# Patient Record
Sex: MALE | Race: WHITE | NOT HISPANIC OR LATINO
[De-identification: names, ages, dates, MRNs, and addresses within clinical notes are randomized per-mention and may not be internally consistent; named-entity substitution may affect disease eponyms.]

---

## 2017-01-05 ENCOUNTER — RX RENEWAL (OUTPATIENT)
Age: 62
End: 2017-01-05

## 2017-01-09 ENCOUNTER — OUTPATIENT (OUTPATIENT)
Dept: OUTPATIENT SERVICES | Facility: HOSPITAL | Age: 62
LOS: 1 days | End: 2017-01-09
Payer: COMMERCIAL

## 2017-01-09 VITALS
TEMPERATURE: 99 F | WEIGHT: 198.42 LBS | RESPIRATION RATE: 16 BRPM | HEIGHT: 72 IN | SYSTOLIC BLOOD PRESSURE: 101 MMHG | HEART RATE: 68 BPM | DIASTOLIC BLOOD PRESSURE: 60 MMHG

## 2017-01-09 DIAGNOSIS — A63.0 ANOGENITAL (VENEREAL) WARTS: ICD-10-CM

## 2017-01-09 DIAGNOSIS — Z01.818 ENCOUNTER FOR OTHER PREPROCEDURAL EXAMINATION: ICD-10-CM

## 2017-01-09 DIAGNOSIS — I10 ESSENTIAL (PRIMARY) HYPERTENSION: ICD-10-CM

## 2017-01-09 DIAGNOSIS — Z98.61 CORONARY ANGIOPLASTY STATUS: Chronic | ICD-10-CM

## 2017-01-09 DIAGNOSIS — Z21 ASYMPTOMATIC HUMAN IMMUNODEFICIENCY VIRUS [HIV] INFECTION STATUS: ICD-10-CM

## 2017-01-09 DIAGNOSIS — Z12.11 ENCOUNTER FOR SCREENING FOR MALIGNANT NEOPLASM OF COLON: ICD-10-CM

## 2017-01-09 DIAGNOSIS — I25.10 ATHEROSCLEROTIC HEART DISEASE OF NATIVE CORONARY ARTERY WITHOUT ANGINA PECTORIS: ICD-10-CM

## 2017-01-09 LAB
ANION GAP SERPL CALC-SCNC: 13 MMOL/L — SIGNIFICANT CHANGE UP (ref 5–17)
APTT BLD: 33.1 SEC — SIGNIFICANT CHANGE UP (ref 27.5–37.4)
BUN SERPL-MCNC: 26 MG/DL — HIGH (ref 8–20)
CALCIUM SERPL-MCNC: 9.9 MG/DL — SIGNIFICANT CHANGE UP (ref 8.6–10.2)
CHLORIDE SERPL-SCNC: 102 MMOL/L — SIGNIFICANT CHANGE UP (ref 98–107)
CO2 SERPL-SCNC: 26 MMOL/L — SIGNIFICANT CHANGE UP (ref 22–29)
CREAT SERPL-MCNC: 1.24 MG/DL — SIGNIFICANT CHANGE UP (ref 0.5–1.3)
GLUCOSE SERPL-MCNC: 87 MG/DL — SIGNIFICANT CHANGE UP (ref 70–115)
HCT VFR BLD CALC: 44.7 % — SIGNIFICANT CHANGE UP (ref 42–52)
HGB BLD-MCNC: 14.7 G/DL — SIGNIFICANT CHANGE UP (ref 14–18)
INR BLD: 1.15 RATIO — SIGNIFICANT CHANGE UP (ref 0.88–1.16)
MCHC RBC-ENTMCNC: 32.9 G/DL — SIGNIFICANT CHANGE UP (ref 32–36)
MCHC RBC-ENTMCNC: 33.5 PG — HIGH (ref 27–31)
MCV RBC AUTO: 101.8 FL — HIGH (ref 80–94)
PLATELET # BLD AUTO: 201 K/UL — SIGNIFICANT CHANGE UP (ref 150–400)
POTASSIUM SERPL-MCNC: 4.6 MMOL/L — SIGNIFICANT CHANGE UP (ref 3.5–5.3)
POTASSIUM SERPL-SCNC: 4.6 MMOL/L — SIGNIFICANT CHANGE UP (ref 3.5–5.3)
PROTHROM AB SERPL-ACNC: 12.7 SEC — SIGNIFICANT CHANGE UP (ref 10–13.1)
RBC # BLD: 4.39 M/UL — LOW (ref 4.6–6.2)
RBC # FLD: 13.2 % — SIGNIFICANT CHANGE UP (ref 11–15.6)
SODIUM SERPL-SCNC: 141 MMOL/L — SIGNIFICANT CHANGE UP (ref 135–145)
WBC # BLD: 9.53 K/UL — SIGNIFICANT CHANGE UP (ref 4.8–10.8)
WBC # FLD AUTO: 9.53 K/UL — SIGNIFICANT CHANGE UP (ref 4.8–10.8)

## 2017-01-09 PROCEDURE — 85027 COMPLETE CBC AUTOMATED: CPT

## 2017-01-09 PROCEDURE — 93005 ELECTROCARDIOGRAM TRACING: CPT

## 2017-01-09 PROCEDURE — 80048 BASIC METABOLIC PNL TOTAL CA: CPT

## 2017-01-09 PROCEDURE — 85610 PROTHROMBIN TIME: CPT

## 2017-01-09 PROCEDURE — 85730 THROMBOPLASTIN TIME PARTIAL: CPT

## 2017-01-09 PROCEDURE — G0463: CPT

## 2017-01-09 PROCEDURE — 93010 ELECTROCARDIOGRAM REPORT: CPT

## 2017-01-09 RX ORDER — TADALAFIL 10 MG/1
1 TABLET, FILM COATED ORAL
Qty: 0 | Refills: 0 | COMMUNITY

## 2017-01-09 RX ORDER — CLONAZEPAM 1 MG
1.5 TABLET ORAL
Qty: 0 | Refills: 0 | COMMUNITY

## 2017-01-09 RX ORDER — OMEGA-3 ACID ETHYL ESTERS 1 G
1360 CAPSULE ORAL
Qty: 0 | Refills: 0 | COMMUNITY

## 2017-01-09 RX ORDER — RITONAVIR 100 MG/1
1 TABLET, FILM COATED ORAL
Qty: 0 | Refills: 0 | COMMUNITY

## 2017-01-09 RX ORDER — ASPIRIN/CALCIUM CARB/MAGNESIUM 324 MG
1 TABLET ORAL
Qty: 0 | Refills: 0 | COMMUNITY

## 2017-01-09 RX ORDER — LISINOPRIL 2.5 MG/1
1 TABLET ORAL
Qty: 0 | Refills: 0 | COMMUNITY

## 2017-01-09 RX ORDER — CHOLECALCIFEROL (VITAMIN D3) 125 MCG
1 CAPSULE ORAL
Qty: 0 | Refills: 0 | COMMUNITY

## 2017-01-09 RX ORDER — EMTRICITABINE AND TENOFOVIR DISOPROXIL FUMARATE 200; 300 MG/1; MG/1
1 TABLET, FILM COATED ORAL
Qty: 0 | Refills: 0 | COMMUNITY

## 2017-01-09 RX ORDER — CARVEDILOL PHOSPHATE 80 MG/1
1 CAPSULE, EXTENDED RELEASE ORAL
Qty: 0 | Refills: 0 | COMMUNITY

## 2017-01-09 RX ORDER — ATAZANAVIR 200 MG/1
1 CAPSULE ORAL
Qty: 0 | Refills: 0 | COMMUNITY

## 2017-01-09 NOTE — H&P PST ADULT - NSANTHOSAYNRD_GEN_A_CORE
No. MENDY screening performed.  STOP BANG Legend: 0-2 = LOW Risk; 3-4 = INTERMEDIATE Risk; 5-8 = HIGH Risk

## 2017-01-09 NOTE — H&P PST ADULT - FAMILY HISTORY
Mother  Still living? No  Family history of diabetes mellitus, Age at diagnosis: Age Unknown  Family history of pancreatic cancer, Age at diagnosis: Age Unknown     Sibling  Still living? No  Family history of diabetes mellitus, Age at diagnosis: Age Unknown  Family history of breast cancer, Age at diagnosis: Age Unknown  Family history of ALL (acute lymphoid leukemia), Age at diagnosis: Age Unknown     Father  Still living? No  Family history of lung cancer, Age at diagnosis: Age Unknown

## 2017-01-09 NOTE — H&P PST ADULT - HISTORY OF PRESENT ILLNESS
61 year old male who presented to [facility] for a screening colonoscopy, no complains of pain, bleeding or constipation. Last colonoscopy was done 12 years ago

## 2017-01-09 NOTE — H&P PST ADULT - PMH
CAD (coronary artery disease)  with one stent 2010  High cholesterol    HIV (human immunodeficiency virus infection)    Hypertension

## 2017-01-10 DIAGNOSIS — Z01.818 ENCOUNTER FOR OTHER PREPROCEDURAL EXAMINATION: ICD-10-CM

## 2017-01-10 DIAGNOSIS — Z12.11 ENCOUNTER FOR SCREENING FOR MALIGNANT NEOPLASM OF COLON: ICD-10-CM

## 2017-01-13 ENCOUNTER — APPOINTMENT (OUTPATIENT)
Dept: COLORECTAL SURGERY | Facility: HOSPITAL | Age: 62
End: 2017-01-13

## 2017-01-13 ENCOUNTER — OUTPATIENT (OUTPATIENT)
Dept: OUTPATIENT SERVICES | Facility: HOSPITAL | Age: 62
LOS: 1 days | End: 2017-01-13
Payer: COMMERCIAL

## 2017-01-13 DIAGNOSIS — Z98.61 CORONARY ANGIOPLASTY STATUS: Chronic | ICD-10-CM

## 2017-01-13 DIAGNOSIS — Z12.11 ENCOUNTER FOR SCREENING FOR MALIGNANT NEOPLASM OF COLON: ICD-10-CM

## 2017-01-13 DIAGNOSIS — A63.0 ANOGENITAL (VENEREAL) WARTS: ICD-10-CM

## 2017-01-13 PROCEDURE — 88305 TISSUE EXAM BY PATHOLOGIST: CPT | Mod: 26

## 2017-01-13 PROCEDURE — 88305 TISSUE EXAM BY PATHOLOGIST: CPT

## 2017-01-13 PROCEDURE — 45380 COLONOSCOPY AND BIOPSY: CPT | Mod: PT

## 2017-01-13 PROCEDURE — 45378 DIAGNOSTIC COLONOSCOPY: CPT

## 2017-01-17 LAB — SURGICAL PATHOLOGY FINAL REPORT - CH: SIGNIFICANT CHANGE UP

## 2017-01-18 ENCOUNTER — OTHER (OUTPATIENT)
Age: 62
End: 2017-01-18

## 2017-02-07 ENCOUNTER — EMERGENCY (EMERGENCY)
Facility: HOSPITAL | Age: 62
LOS: 1 days | End: 2017-02-07

## 2017-02-07 DIAGNOSIS — Z98.61 CORONARY ANGIOPLASTY STATUS: Chronic | ICD-10-CM

## 2017-03-09 PROBLEM — I10 ESSENTIAL (PRIMARY) HYPERTENSION: Chronic | Status: ACTIVE | Noted: 2017-01-09

## 2017-03-09 PROBLEM — E78.00 PURE HYPERCHOLESTEROLEMIA, UNSPECIFIED: Chronic | Status: ACTIVE | Noted: 2017-01-09

## 2017-03-09 PROBLEM — Z21 ASYMPTOMATIC HUMAN IMMUNODEFICIENCY VIRUS [HIV] INFECTION STATUS: Chronic | Status: ACTIVE | Noted: 2017-01-09

## 2017-03-09 PROBLEM — I25.10 ATHEROSCLEROTIC HEART DISEASE OF NATIVE CORONARY ARTERY WITHOUT ANGINA PECTORIS: Chronic | Status: ACTIVE | Noted: 2017-01-09

## 2017-05-11 ENCOUNTER — RX RENEWAL (OUTPATIENT)
Age: 62
End: 2017-05-11

## 2017-05-11 ENCOUNTER — MEDICATION RENEWAL (OUTPATIENT)
Age: 62
End: 2017-05-11

## 2017-05-26 ENCOUNTER — APPOINTMENT (OUTPATIENT)
Dept: CARDIOLOGY | Facility: CLINIC | Age: 62
End: 2017-05-26

## 2017-08-18 ENCOUNTER — TRANSCRIPTION ENCOUNTER (OUTPATIENT)
Age: 62
End: 2017-08-18

## 2017-10-02 ENCOUNTER — RECORD ABSTRACTING (OUTPATIENT)
Age: 62
End: 2017-10-02

## 2017-10-02 DIAGNOSIS — F41.9 ANXIETY DISORDER, UNSPECIFIED: ICD-10-CM

## 2017-10-02 DIAGNOSIS — Z87.891 PERSONAL HISTORY OF NICOTINE DEPENDENCE: ICD-10-CM

## 2017-10-02 DIAGNOSIS — Z82.49 FAMILY HISTORY OF ISCHEMIC HEART DISEASE AND OTHER DISEASES OF THE CIRCULATORY SYSTEM: ICD-10-CM

## 2017-10-30 ENCOUNTER — APPOINTMENT (OUTPATIENT)
Dept: CARDIOLOGY | Facility: CLINIC | Age: 62
End: 2017-10-30
Payer: COMMERCIAL

## 2017-10-30 PROCEDURE — 93306 TTE W/DOPPLER COMPLETE: CPT

## 2017-11-08 ENCOUNTER — APPOINTMENT (OUTPATIENT)
Dept: CARDIOLOGY | Facility: CLINIC | Age: 62
End: 2017-11-08
Payer: COMMERCIAL

## 2017-11-08 VITALS
BODY MASS INDEX: 27.22 KG/M2 | HEIGHT: 72 IN | OXYGEN SATURATION: 96 % | SYSTOLIC BLOOD PRESSURE: 118 MMHG | WEIGHT: 201 LBS | HEART RATE: 61 BPM | DIASTOLIC BLOOD PRESSURE: 62 MMHG

## 2017-11-08 DIAGNOSIS — B20 HUMAN IMMUNODEFICIENCY VIRUS [HIV] DISEASE: ICD-10-CM

## 2017-11-08 PROCEDURE — 99215 OFFICE O/P EST HI 40 MIN: CPT

## 2017-12-14 ENCOUNTER — APPOINTMENT (OUTPATIENT)
Dept: UROLOGY | Facility: CLINIC | Age: 62
End: 2017-12-14

## 2018-02-22 ENCOUNTER — APPOINTMENT (OUTPATIENT)
Dept: UROLOGY | Facility: CLINIC | Age: 63
End: 2018-02-22
Payer: COMMERCIAL

## 2018-02-22 VITALS — SYSTOLIC BLOOD PRESSURE: 100 MMHG | DIASTOLIC BLOOD PRESSURE: 63 MMHG | OXYGEN SATURATION: 96 % | HEART RATE: 62 BPM

## 2018-02-22 PROCEDURE — 99214 OFFICE O/P EST MOD 30 MIN: CPT

## 2018-05-30 ENCOUNTER — APPOINTMENT (OUTPATIENT)
Dept: CARDIOLOGY | Facility: CLINIC | Age: 63
End: 2018-05-30
Payer: COMMERCIAL

## 2018-05-30 VITALS
BODY MASS INDEX: 27.63 KG/M2 | OXYGEN SATURATION: 97 % | SYSTOLIC BLOOD PRESSURE: 90 MMHG | HEART RATE: 68 BPM | DIASTOLIC BLOOD PRESSURE: 58 MMHG | WEIGHT: 204 LBS | HEIGHT: 72 IN

## 2018-05-30 DIAGNOSIS — R00.2 PALPITATIONS: ICD-10-CM

## 2018-05-30 DIAGNOSIS — R20.2 PARESTHESIA OF SKIN: ICD-10-CM

## 2018-05-30 PROCEDURE — 99214 OFFICE O/P EST MOD 30 MIN: CPT

## 2018-05-30 RX ORDER — METHOCARBAMOL 500 MG/1
500 TABLET, FILM COATED ORAL
Qty: 20 | Refills: 0 | Status: DISCONTINUED | COMMUNITY
Start: 2018-02-19 | End: 2018-05-30

## 2018-05-30 RX ORDER — PRAVASTATIN SODIUM 20 MG/1
20 TABLET ORAL
Qty: 30 | Refills: 0 | Status: DISCONTINUED | COMMUNITY
Start: 2017-01-11 | End: 2018-05-30

## 2018-06-18 ENCOUNTER — APPOINTMENT (OUTPATIENT)
Dept: CARDIOLOGY | Facility: CLINIC | Age: 63
End: 2018-06-18
Payer: COMMERCIAL

## 2018-06-18 PROCEDURE — 93015 CV STRESS TEST SUPVJ I&R: CPT

## 2018-06-18 PROCEDURE — 78452 HT MUSCLE IMAGE SPECT MULT: CPT

## 2018-06-18 PROCEDURE — A9502: CPT

## 2018-06-18 PROCEDURE — 93880 EXTRACRANIAL BILAT STUDY: CPT

## 2018-07-23 ENCOUNTER — RX RENEWAL (OUTPATIENT)
Age: 63
End: 2018-07-23

## 2018-07-24 ENCOUNTER — RX RENEWAL (OUTPATIENT)
Age: 63
End: 2018-07-24

## 2018-07-25 ENCOUNTER — RX RENEWAL (OUTPATIENT)
Age: 63
End: 2018-07-25

## 2018-08-01 ENCOUNTER — TRANSCRIPTION ENCOUNTER (OUTPATIENT)
Age: 63
End: 2018-08-01

## 2018-08-01 ENCOUNTER — APPOINTMENT (OUTPATIENT)
Dept: CARDIOLOGY | Facility: CLINIC | Age: 63
End: 2018-08-01
Payer: COMMERCIAL

## 2018-08-01 VITALS
DIASTOLIC BLOOD PRESSURE: 70 MMHG | HEART RATE: 68 BPM | SYSTOLIC BLOOD PRESSURE: 120 MMHG | BODY MASS INDEX: 27.63 KG/M2 | WEIGHT: 204 LBS | HEIGHT: 72 IN

## 2018-08-01 DIAGNOSIS — Z80.0 FAMILY HISTORY OF MALIGNANT NEOPLASM OF DIGESTIVE ORGANS: ICD-10-CM

## 2018-08-01 DIAGNOSIS — Z80.1 FAMILY HISTORY OF MALIGNANT NEOPLASM OF TRACHEA, BRONCHUS AND LUNG: ICD-10-CM

## 2018-08-01 PROCEDURE — 99214 OFFICE O/P EST MOD 30 MIN: CPT

## 2018-08-02 NOTE — H&P PST ADULT - HEIGHT IN FEET
Otc Regimen: Lidocaine gel 4% prn for pain- sub mammary and axillae Continue Regimen: Prednisone 10 mg QD Samples Given: Sernivo spray QD to axillae, sub mammary region Detail Level: Zone Plan: Suggested patch test with Dr Bennett 6

## 2018-08-08 ENCOUNTER — MOBILE ON CALL (OUTPATIENT)
Age: 63
End: 2018-08-08

## 2018-12-10 ENCOUNTER — TRANSCRIPTION ENCOUNTER (OUTPATIENT)
Age: 63
End: 2018-12-10

## 2019-01-04 RX ORDER — PRAVASTATIN SODIUM 20 MG/1
20 TABLET ORAL
Qty: 90 | Refills: 1 | Status: DISCONTINUED | COMMUNITY
Start: 2017-01-11 | End: 2019-01-04

## 2019-01-05 ENCOUNTER — RX RENEWAL (OUTPATIENT)
Age: 64
End: 2019-01-05

## 2019-01-14 ENCOUNTER — RX RENEWAL (OUTPATIENT)
Age: 64
End: 2019-01-14

## 2019-02-21 ENCOUNTER — APPOINTMENT (OUTPATIENT)
Dept: UROLOGY | Facility: CLINIC | Age: 64
End: 2019-02-21

## 2019-03-06 ENCOUNTER — APPOINTMENT (OUTPATIENT)
Dept: CARDIOLOGY | Facility: CLINIC | Age: 64
End: 2019-03-06
Payer: MEDICARE

## 2019-03-06 VITALS
OXYGEN SATURATION: 98 % | WEIGHT: 212 LBS | BODY MASS INDEX: 28.71 KG/M2 | SYSTOLIC BLOOD PRESSURE: 98 MMHG | HEIGHT: 72 IN | HEART RATE: 61 BPM | DIASTOLIC BLOOD PRESSURE: 58 MMHG

## 2019-03-06 PROCEDURE — 99214 OFFICE O/P EST MOD 30 MIN: CPT

## 2019-03-06 RX ORDER — EZETIMIBE 10 MG/1
10 TABLET ORAL
Qty: 90 | Refills: 1 | Status: DISCONTINUED | COMMUNITY
Start: 2018-05-30 | End: 2019-03-06

## 2019-03-06 NOTE — PHYSICAL EXAM
[General Appearance - Well Developed] : well developed [Normal Appearance] : normal appearance [Well Groomed] : well groomed [General Appearance - Well Nourished] : well nourished [No Deformities] : no deformities [General Appearance - In No Acute Distress] : no acute distress [Normal Conjunctiva] : the conjunctiva exhibited no abnormalities [Eyelids - No Xanthelasma] : the eyelids demonstrated no xanthelasmas [Normal Oral Mucosa] : normal oral mucosa [No Oral Pallor] : no oral pallor [No Oral Cyanosis] : no oral cyanosis [Normal Jugular Venous A Waves Present] : normal jugular venous A waves present [Normal Jugular Venous V Waves Present] : normal jugular venous V waves present [No Jugular Venous Hampton A Waves] : no jugular venous hampton A waves [Respiration, Rhythm And Depth] : normal respiratory rhythm and effort [Exaggerated Use Of Accessory Muscles For Inspiration] : no accessory muscle use [Auscultation Breath Sounds / Voice Sounds] : lungs were clear to auscultation bilaterally [Heart Rate And Rhythm] : heart rate and rhythm were normal [Heart Sounds] : normal S1 and S2 [Murmurs] : no murmurs present [Abdomen Soft] : soft [Abdomen Tenderness] : non-tender [Abdomen Mass (___ Cm)] : no abdominal mass palpated [Abnormal Walk] : normal gait [Gait - Sufficient For Exercise Testing] : the gait was sufficient for exercise testing [Nail Clubbing] : no clubbing of the fingernails [Cyanosis, Localized] : no localized cyanosis [Petechial Hemorrhages (___cm)] : no petechial hemorrhages [Skin Color & Pigmentation] : normal skin color and pigmentation [] : no rash [No Venous Stasis] : no venous stasis [Skin Lesions] : no skin lesions [No Skin Ulcers] : no skin ulcer [No Xanthoma] : no  xanthoma was observed [Oriented To Time, Place, And Person] : oriented to person, place, and time [Affect] : the affect was normal [Mood] : the mood was normal [No Anxiety] : not feeling anxious

## 2019-03-09 NOTE — REVIEW OF SYSTEMS
[see HPI] : see HPI [Dizziness] : dizziness [Tingling (Paresthesia)] : tingling [Negative] : Heme/Lymph [Dyspnea on exertion] : not dyspnea during exertion [Palpitations] : no palpitations

## 2019-03-09 NOTE — ASSESSMENT
[FreeTextEntry1] : Coronary artery disease. \par Prior myocardial infarction and percutaneous revascularization. \par Nuclear stress test with fixed defect. NO ischemia\par Normal LV function\par Baseline low blood pressure.  Previously, lowered dose of carvedilol. \par Asymptomatic. \par Continue aspirin. \par beta blocker therapy.  Lower dose due to dizziness, LOW bp\par Smoking cessation emphasized for long-term cardiovascular health\par Continue statin therapy.  Statin intolerant. Addition of Zetia. \par Declines PCSK9 inhibitor. Concerned about cost and self injection. \par Will screen for clinical trial\par

## 2019-03-09 NOTE — HISTORY OF PRESENT ILLNESS
[FreeTextEntry1] : JESÚS LOZANO  is a 63 year old  M\par There is a history of coronary artery disease. \par In 2009 he had a myocardial infarction.\par Presented with abrupt onset of chest discomfort which was treated with a stent.  \par He reports subsequently having an abnormal stress test and repeat angiography. \par \par There are no further symptoms of angina. \par He does report symptoms of shortness of  breath. \par At times he is aware of his heartbeat when he is physically active. \par He was previously told of heart damage and a leaky heart valve. \par He only takes the pravastatin intermittently due to symptoms of  myalgias. \par He does have symptoms of a hand cramps related to his work. \par He is now off the ACE inhibitor. \par There has been improvement in his symptoms of dizziness\par Current cardiovascular regimen includes aspirin pravastatin and carvedilol. \par \par Carotid Doppler. Mild plaque. No stenosis. \par Nuclear stress test. No ischemia. Small fixed defect. Normal LV function. \par \par Echocardiogram October 2017. Normal left ventricular function. \par Last blood work, total cholesterol 205, , hemoglobin 13.9, creatinine 1.3. \par

## 2019-05-13 ENCOUNTER — MEDICATION RENEWAL (OUTPATIENT)
Age: 64
End: 2019-05-13

## 2019-05-13 ENCOUNTER — RX RENEWAL (OUTPATIENT)
Age: 64
End: 2019-05-13

## 2019-06-24 ENCOUNTER — TRANSCRIPTION ENCOUNTER (OUTPATIENT)
Age: 64
End: 2019-06-24

## 2019-06-26 ENCOUNTER — APPOINTMENT (OUTPATIENT)
Dept: UROLOGY | Facility: CLINIC | Age: 64
End: 2019-06-26
Payer: MEDICARE

## 2019-06-26 VITALS
HEART RATE: 70 BPM | WEIGHT: 205 LBS | BODY MASS INDEX: 27.77 KG/M2 | HEIGHT: 72 IN | SYSTOLIC BLOOD PRESSURE: 114 MMHG | OXYGEN SATURATION: 98 % | DIASTOLIC BLOOD PRESSURE: 70 MMHG

## 2019-06-26 PROCEDURE — 99213 OFFICE O/P EST LOW 20 MIN: CPT

## 2019-06-26 NOTE — PHYSICAL EXAM
[General Appearance - Well Developed] : well developed [General Appearance - Well Nourished] : well nourished [Normal Appearance] : normal appearance [Well Groomed] : well groomed [General Appearance - In No Acute Distress] : no acute distress [Bowel Sounds] : normal bowel sounds [Abdomen Soft] : soft [Abdomen Tenderness] : non-tender [Abdomen Mass (___ Cm)] : no abdominal mass palpated [Costovertebral Angle Tenderness] : no ~M costovertebral angle tenderness [] : no rash [Oriented To Time, Place, And Person] : oriented to person, place, and time [Affect] : the affect was normal [Mood] : the mood was normal [Not Anxious] : not anxious [Normal Station and Gait] : the gait and station were normal for the patient's age [No Focal Deficits] : no focal deficits

## 2019-06-26 NOTE — HISTORY OF PRESENT ILLNESS
[FreeTextEntry1] : The patient is a 62-year-old gentleman who was seen in the office today for the above. He is presently on low-dose Cialis 5 mg p.o. daily for BPH and erectile dysfunction. He reports that this has been effective. His daytime frequency 6 times a day with nocturia x3 but he admits to eating and drinking snacks at the dinner. He denies all other urological and constitutional symptomatology.\par \par His past medical history, surgical history, medications history and allergy history are unchanged.

## 2019-07-29 ENCOUNTER — APPOINTMENT (OUTPATIENT)
Dept: ULTRASOUND IMAGING | Facility: CLINIC | Age: 64
End: 2019-07-29
Payer: MEDICARE

## 2019-07-29 PROCEDURE — 76857 US EXAM PELVIC LIMITED: CPT

## 2019-08-23 ENCOUNTER — EMERGENCY (EMERGENCY)
Facility: HOSPITAL | Age: 64
LOS: 1 days | End: 2019-08-23
Admitting: EMERGENCY MEDICINE
Payer: MEDICARE

## 2019-08-23 DIAGNOSIS — Z98.61 CORONARY ANGIOPLASTY STATUS: Chronic | ICD-10-CM

## 2019-08-23 PROCEDURE — 99284 EMERGENCY DEPT VISIT MOD MDM: CPT

## 2019-08-23 PROCEDURE — 70450 CT HEAD/BRAIN W/O DYE: CPT | Mod: 26,59

## 2019-08-23 PROCEDURE — 70498 CT ANGIOGRAPHY NECK: CPT | Mod: 26

## 2019-08-23 PROCEDURE — 73080 X-RAY EXAM OF ELBOW: CPT | Mod: 26,LT

## 2019-08-23 PROCEDURE — 70496 CT ANGIOGRAPHY HEAD: CPT | Mod: 26

## 2019-08-24 PROCEDURE — 93010 ELECTROCARDIOGRAM REPORT: CPT

## 2019-08-26 ENCOUNTER — MEDICATION RENEWAL (OUTPATIENT)
Age: 64
End: 2019-08-26

## 2019-09-06 RX ORDER — ROSUVASTATIN CALCIUM 5 MG/1
5 TABLET, FILM COATED ORAL
Qty: 90 | Refills: 1 | Status: DISCONTINUED | COMMUNITY
Start: 1900-01-01 | End: 2019-09-06

## 2019-10-02 ENCOUNTER — APPOINTMENT (OUTPATIENT)
Dept: CARDIOLOGY | Facility: CLINIC | Age: 64
End: 2019-10-02
Payer: MEDICARE

## 2019-10-02 ENCOUNTER — RECORD ABSTRACTING (OUTPATIENT)
Age: 64
End: 2019-10-02

## 2019-10-02 VITALS
HEIGHT: 72 IN | OXYGEN SATURATION: 95 % | DIASTOLIC BLOOD PRESSURE: 70 MMHG | SYSTOLIC BLOOD PRESSURE: 120 MMHG | BODY MASS INDEX: 27.36 KG/M2 | HEART RATE: 62 BPM | WEIGHT: 202 LBS

## 2019-10-02 PROCEDURE — 99214 OFFICE O/P EST MOD 30 MIN: CPT

## 2019-10-02 NOTE — PHYSICAL EXAM
[General Appearance - Well Developed] : well developed [Well Groomed] : well groomed [Normal Appearance] : normal appearance [General Appearance - Well Nourished] : well nourished [General Appearance - In No Acute Distress] : no acute distress [No Deformities] : no deformities [Normal Conjunctiva] : the conjunctiva exhibited no abnormalities [Eyelids - No Xanthelasma] : the eyelids demonstrated no xanthelasmas [Normal Oral Mucosa] : normal oral mucosa [No Oral Pallor] : no oral pallor [No Oral Cyanosis] : no oral cyanosis [Normal Jugular Venous A Waves Present] : normal jugular venous A waves present [Normal Jugular Venous V Waves Present] : normal jugular venous V waves present [No Jugular Venous Hampton A Waves] : no jugular venous hampton A waves [Respiration, Rhythm And Depth] : normal respiratory rhythm and effort [Exaggerated Use Of Accessory Muscles For Inspiration] : no accessory muscle use [Auscultation Breath Sounds / Voice Sounds] : lungs were clear to auscultation bilaterally [Heart Rate And Rhythm] : heart rate and rhythm were normal [Heart Sounds] : normal S1 and S2 [Murmurs] : no murmurs present [Abdomen Tenderness] : non-tender [Abdomen Soft] : soft [Abdomen Mass (___ Cm)] : no abdominal mass palpated [Abnormal Walk] : normal gait [Gait - Sufficient For Exercise Testing] : the gait was sufficient for exercise testing [Nail Clubbing] : no clubbing of the fingernails [Cyanosis, Localized] : no localized cyanosis [Petechial Hemorrhages (___cm)] : no petechial hemorrhages [Skin Color & Pigmentation] : normal skin color and pigmentation [] : no rash [No Venous Stasis] : no venous stasis [Skin Lesions] : no skin lesions [No Skin Ulcers] : no skin ulcer [No Xanthoma] : no  xanthoma was observed [Oriented To Time, Place, And Person] : oriented to person, place, and time [Affect] : the affect was normal [Mood] : the mood was normal [No Anxiety] : not feeling anxious

## 2019-10-05 NOTE — REASON FOR VISIT
[Follow-Up - Clinic] : a clinic follow-up of [Coronary Artery Disease] : coronary artery disease [Hyperlipidemia] : hyperlipidemia [Hypertension] : hypertension [Dizziness] : dizziness

## 2019-10-05 NOTE — REVIEW OF SYSTEMS
[see HPI] : see HPI [Tingling (Paresthesia)] : tingling [Negative] : Heme/Lymph [Joint Pain] : joint pain [Dyspnea on exertion] : not dyspnea during exertion [Dizziness] : no dizziness

## 2019-10-05 NOTE — ASSESSMENT
[FreeTextEntry1] : \par Coronary artery disease. \par Prior myocardial infarction and percutaneous revascularization. \par Nuclear stress test with fixed defect. NO ischemia\par Normal LV function\par Baseline low blood pressure. \par Asymptomatic. \par Continue aspirin. \par beta blocker therapy.  Lower dose due to dizziness, low BP\par Smoking cessation emphasized for long-term cardiovascular health\par On max tolerated statin rx.  Declines PCSK9 inhibitor. Concerned about cost and self injection. \par Will screen for clinical trial. \par Labs requested. \par \par

## 2019-10-05 NOTE — HISTORY OF PRESENT ILLNESS
[FreeTextEntry1] : JESÚS LOZANO  is a 64 year old  M\par \par There is a history of coronary artery disease. \par In 2009 he had a myocardial infarction.\par Presented with abrupt onset of chest discomfort which was treated with a stent.  \par He reports subsequently having an abnormal stress test and repeat angiography. \par \par There are no further symptoms of angina. \par He does report symptoms of shortness of  breath. \par At times he is aware of his heartbeat when he is physically active. \par He was previously told of heart damage and a leaky heart valve. \par He is now off the ACE inhibitor. \par There has been improvement in his symptoms of dizziness\par \par There was an episode of facial paresthesias, and [place] [facility] \par He subsequently had followup with neurology. \par Does feel symptoms of muscle aches on his low-dose statin therapy.\par Interested in getting back into a regular exercise program.\par \par Last EKG demonstrates sinus bradycardia, normal EKG. \par Carotid Doppler. Mild plaque. No stenosis. \par Nuclear stress test. No ischemia. Small fixed defect. Normal LV function. \par Echocardiogram October 2017. Normal left ventricular function. \par Last blood work, total cholesterol 205, , hemoglobin 13.9, creatinine 1.3. \par

## 2019-11-11 ENCOUNTER — RECORD ABSTRACTING (OUTPATIENT)
Age: 64
End: 2019-11-11

## 2019-11-15 ENCOUNTER — APPOINTMENT (OUTPATIENT)
Dept: CARDIOLOGY | Facility: CLINIC | Age: 64
End: 2019-11-15

## 2019-12-18 ENCOUNTER — APPOINTMENT (OUTPATIENT)
Dept: UROLOGY | Facility: CLINIC | Age: 64
End: 2019-12-18

## 2020-02-14 ENCOUNTER — TRANSCRIPTION ENCOUNTER (OUTPATIENT)
Age: 65
End: 2020-02-14

## 2020-05-08 ENCOUNTER — RX RENEWAL (OUTPATIENT)
Age: 65
End: 2020-05-08

## 2020-07-29 ENCOUNTER — APPOINTMENT (OUTPATIENT)
Dept: UROLOGY | Facility: CLINIC | Age: 65
End: 2020-07-29
Payer: MEDICARE

## 2020-07-29 VITALS
BODY MASS INDEX: 27.36 KG/M2 | DIASTOLIC BLOOD PRESSURE: 76 MMHG | HEIGHT: 72 IN | WEIGHT: 202 LBS | SYSTOLIC BLOOD PRESSURE: 114 MMHG

## 2020-07-29 VITALS — TEMPERATURE: 97.2 F

## 2020-07-29 PROCEDURE — 99214 OFFICE O/P EST MOD 30 MIN: CPT

## 2020-07-29 NOTE — ASSESSMENT
[FreeTextEntry1] : #1) BPH: Continue low dose Cialis 5 mg daily- refill needed.\par \par #2) erectile dysfunction: Continue as above.\par \par He will return in one year for reevaluation.

## 2020-07-29 NOTE — PHYSICAL EXAM
[General Appearance - Well Developed] : well developed [Normal Appearance] : normal appearance [General Appearance - Well Nourished] : well nourished [Well Groomed] : well groomed [Bowel Sounds] : normal bowel sounds [General Appearance - In No Acute Distress] : no acute distress [Abdomen Soft] : soft [Abdomen Mass (___ Cm)] : no abdominal mass palpated [Abdomen Tenderness] : non-tender [Oriented To Time, Place, And Person] : oriented to person, place, and time [Costovertebral Angle Tenderness] : no ~M costovertebral angle tenderness [Affect] : the affect was normal [Mood] : the mood was normal [Not Anxious] : not anxious [No Focal Deficits] : no focal deficits [Normal Station and Gait] : the gait and station were normal for the patient's age [Anus Abnormality] : the anus and perineum were normal [Rectal Exam - Rectum] : digital rectal exam was normal [Prostate Tenderness] : the prostate was not tender [Prostate Size ___ gm] : prostate size [unfilled] gm [Edema] : no peripheral edema [No Prostate Nodules] : no prostate nodules [Exaggerated Use Of Accessory Muscles For Inspiration] : no accessory muscle use [Respiration, Rhythm And Depth] : normal respiratory rhythm and effort [] : no respiratory distress [Auscultation Breath Sounds / Voice Sounds] : lungs were clear to auscultation bilaterally [No Palpable Adenopathy] : no palpable adenopathy [FreeTextEntry1] : s1-S2 normal without murmur rub or gallop

## 2020-09-09 ENCOUNTER — APPOINTMENT (OUTPATIENT)
Dept: COLORECTAL SURGERY | Facility: CLINIC | Age: 65
End: 2020-09-09
Payer: MEDICARE

## 2020-09-09 VITALS
DIASTOLIC BLOOD PRESSURE: 78 MMHG | SYSTOLIC BLOOD PRESSURE: 116 MMHG | HEIGHT: 72 IN | RESPIRATION RATE: 14 BRPM | WEIGHT: 215 LBS | BODY MASS INDEX: 29.12 KG/M2 | HEART RATE: 59 BPM | TEMPERATURE: 97.1 F

## 2020-09-09 PROCEDURE — 46600 DIAGNOSTIC ANOSCOPY SPX: CPT

## 2020-09-09 PROCEDURE — 99203 OFFICE O/P NEW LOW 30 MIN: CPT

## 2020-10-28 ENCOUNTER — NON-APPOINTMENT (OUTPATIENT)
Age: 65
End: 2020-10-28

## 2020-10-28 ENCOUNTER — APPOINTMENT (OUTPATIENT)
Dept: CARDIOLOGY | Facility: CLINIC | Age: 65
End: 2020-10-28
Payer: MEDICARE

## 2020-10-28 VITALS
HEART RATE: 65 BPM | DIASTOLIC BLOOD PRESSURE: 60 MMHG | BODY MASS INDEX: 28.31 KG/M2 | SYSTOLIC BLOOD PRESSURE: 100 MMHG | WEIGHT: 209 LBS | HEIGHT: 72 IN | OXYGEN SATURATION: 96 %

## 2020-10-28 DIAGNOSIS — Z00.00 ENCOUNTER FOR GENERAL ADULT MEDICAL EXAMINATION W/OUT ABNORMAL FINDINGS: ICD-10-CM

## 2020-10-28 PROCEDURE — 99214 OFFICE O/P EST MOD 30 MIN: CPT

## 2020-10-28 PROCEDURE — 93000 ELECTROCARDIOGRAM COMPLETE: CPT

## 2020-10-28 NOTE — REASON FOR VISIT
[Follow-Up - Clinic] : a clinic follow-up of [Coronary Artery Disease] : coronary artery disease [Dizziness] : dizziness [Hyperlipidemia] : hyperlipidemia [Hypertension] : hypertension

## 2020-10-29 NOTE — CONSULT LETTER
[Dear  ___] : Dear  [unfilled], [Consult Letter:] : I had the pleasure of evaluating your patient, [unfilled]. [Please see my note below.] : Please see my note below. [Consult Closing:] : Thank you very much for allowing me to participate in the care of this patient.  If you have any questions, please do not hesitate to contact me. [Sincerely,] : Sincerely, [FreeTextEntry3] : Albert Hayes MD\par

## 2020-10-29 NOTE — ASSESSMENT
[FreeTextEntry1] : Coronary artery disease. \par Prior myocardial infarction and percutaneous revascularization. \par Nuclear stress test with fixed defect. NO ischemia\par Baseline low blood pressure. \par HL\par \par Requested a follow-up echocardiogram and abdominal ultrasound \par Discussed further lipid-lowering therapy, now on maximum tolerated dose of statin therapy \par Discussed possible involvement of clinical trial if not excluded versus alternative rx with PCSK9 inhibitor versus bempedoic acid \par Wll need to review for potential interactions with his current medical therapy as well as any exclusion criteria for the clinical trial \par Research team notified\par  \par Continue aspirin. \par beta blocker therapy\par Smoking cessation emphasized for long-term cardiovascular health\par

## 2020-10-29 NOTE — HISTORY OF PRESENT ILLNESS
[FreeTextEntry1] : JESÚS LOZANO  is a 65 year old  M\par \par There is a history of coronary artery disease. \par In 2009 he had a myocardial infarction.\par Presented with abrupt onset of chest discomfort which was treated with a stent.  \par There are no further symptoms of angina. \par There is no history of symptomatic congestive heart failure rheumatic heart disease or valvular disease.\par There is no history of symptomatic arrhythmias including atrial fibrillation.\par  \par He is now off the ACE inhibitor. \par There has been improvement in his symptoms of dizziness\par \par Stress related to his pet\par There is no exertional chest pain, pressure or discomfort. \par There is no significant dyspnea on exertion or orthopnea. \par There are no symptomatic palpitations, lightheadedness, dizziness or syncope.\par \par Labs hemoglobin of 14.9 potassium 4.9 creatinine 1.1 HDL 63 LDL 99 total cholesterol 189 LP(a) within normal limits\par EKG demonstrates sinus rhythm \par Carotid Doppler. Mild plaque. No stenosis. \par Nuclear stress test. No ischemia. Small fixed defect. Normal LV function.

## 2020-10-29 NOTE — HISTORY OF PRESENT ILLNESS
[FreeTextEntry1] : 65 year old male who presents for consultation for rectal bleeding. He had a self limited episode which was associated with constipation. No rectal pain or changes in bowel function. His last colonoscopy was in 2017 and small hyperplastic polyps were removed but it was otherwise normal. He has a history of HIV infection and HPV on anal pap

## 2020-10-29 NOTE — PHYSICAL EXAM
[Excoriation] : no perianal excoriation [Normal] : was normal [None] : there was no rectal mass  [Gross Blood] : no gross blood [Respiratory Effort] : normal respiratory effort [Normal Rate and Rhythm] : normal rate and rhythm [Calm] : calm [de-identified] : soft, non tender, non distended [de-identified] : mild internal hemorrhoids [de-identified] : well appearing, in no distress [de-identified] : normocephalic, atrauamtic [de-identified] : moves extremities without difficulty [de-identified] : warm and dry [de-identified] : alert and oriented x 3

## 2020-10-29 NOTE — REVIEW OF SYSTEMS
[Joint Pain] : joint pain [see HPI] : see HPI [Tingling (Paresthesia)] : tingling [Negative] : Heme/Lymph [Dyspnea on exertion] : not dyspnea during exertion [Dizziness] : no dizziness

## 2020-11-07 DIAGNOSIS — R79.89 OTHER SPECIFIED ABNORMAL FINDINGS OF BLOOD CHEMISTRY: ICD-10-CM

## 2021-05-17 NOTE — ASSESSMENT
[FreeTextEntry1] : #1) BPH: Continue low dose Cialis 5 mg daily-no refill needed.\par \par #2) erectile dysfunction: Continue as above.\par \par He will return in one year for reevaluation.
no

## 2021-06-14 ENCOUNTER — NON-APPOINTMENT (OUTPATIENT)
Age: 66
End: 2021-06-14

## 2021-06-15 ENCOUNTER — APPOINTMENT (OUTPATIENT)
Dept: CARDIOLOGY | Facility: CLINIC | Age: 66
End: 2021-06-15
Payer: MEDICARE

## 2021-06-15 VITALS
HEIGHT: 72 IN | DIASTOLIC BLOOD PRESSURE: 70 MMHG | WEIGHT: 210 LBS | HEART RATE: 61 BPM | OXYGEN SATURATION: 98 % | BODY MASS INDEX: 28.44 KG/M2 | TEMPERATURE: 98 F | SYSTOLIC BLOOD PRESSURE: 110 MMHG

## 2021-06-15 DIAGNOSIS — I25.10 ATHEROSCLEROTIC HEART DISEASE OF NATIVE CORONARY ARTERY W/OUT ANGINA PECTORIS: ICD-10-CM

## 2021-06-15 DIAGNOSIS — Z87.898 PERSONAL HISTORY OF OTHER SPECIFIED CONDITIONS: ICD-10-CM

## 2021-06-15 PROCEDURE — 99214 OFFICE O/P EST MOD 30 MIN: CPT

## 2021-06-15 RX ORDER — EMTRICITABINE AND TENOFOVIR ALAFENAMIDE 200; 25 MG/1; MG/1
200-25 TABLET ORAL DAILY
Refills: 0 | Status: DISCONTINUED | COMMUNITY
Start: 2017-08-03 | End: 2021-06-15

## 2021-06-15 RX ORDER — ROSUVASTATIN CALCIUM 5 MG/1
5 TABLET, FILM COATED ORAL
Qty: 90 | Refills: 3 | Status: DISCONTINUED | COMMUNITY
Start: 2019-05-13 | End: 2021-06-15

## 2021-06-15 RX ORDER — CHROMIUM 200 MCG
TABLET ORAL
Refills: 0 | Status: ACTIVE | COMMUNITY

## 2021-06-15 RX ORDER — EMTRICITABINE, RILPIVIRINE HYDROCHLORIDE, AND TENOFOVIR ALAFENAMIDE 200; 25; 25 MG/1; MG/1; MG/1
200-25-25 TABLET ORAL DAILY
Refills: 0 | Status: ACTIVE | COMMUNITY

## 2021-06-16 ENCOUNTER — NON-APPOINTMENT (OUTPATIENT)
Age: 66
End: 2021-06-16

## 2021-06-16 DIAGNOSIS — T46.6X5A MYALGIA, UNSPECIFIED SITE: ICD-10-CM

## 2021-06-16 DIAGNOSIS — M79.10 MYALGIA, UNSPECIFIED SITE: ICD-10-CM

## 2021-06-16 PROBLEM — Z87.898 HISTORY OF DIZZINESS: Status: RESOLVED | Noted: 2017-10-02 | Resolved: 2021-06-16

## 2021-06-16 PROBLEM — Z87.898 HISTORY OF SHORTNESS OF BREATH: Status: RESOLVED | Noted: 2017-10-02 | Resolved: 2021-06-16

## 2021-06-16 NOTE — ASSESSMENT
[FreeTextEntry1] : JESÚS LOZANO is a 66 year old M who presents today Bartolo 15, 2021 here for routine cardiovascular followup. \par \par 1) Coronary artery disease. \par Prior myocardial infarction and percutaneous revascularization 2009. \par Nuclear stress test 2018 with fixed defect. No ischemia. \par Currently asymptomatic at good functional status. \par Recommend surveillance echocardiogram to monitor LV systolic function. Consider ischemic eval if any evidence of LV dysfunction is noted or new exertional symptoms. \par Aggressive medical therapy and risk modification discussed in detail, especially need for lifelong ASA therapy!\par He will restart ASA 81mg daily. Limit NSAIDS. \par Intol to multiple statin agents with debilitating myalgias. Start PSCK9-I. No reported interactions with Odefsey. \par \par 2) HLD\par Injectable lipid lowering rx as above. Discussed mediterranean diet and lifestyle changes, importance of lowering LDL, eventual goal <70. \par \par 3) Tobacco use. \par Smoking cessation counseling done. Pt is motivated to quit. \par \par Clinical followup in 3 mo to ensure med compliance and reassess lipids/review echo at that time. \par Any questions and concerns were addressed and resolved. \par \par Sincerely,\par \par MADELAINE White\par Patients history, testing, and plan reviewed with supervising MD: Dr. Augustine Madrid \par

## 2021-06-16 NOTE — COUNSELING
[Risk of tobacco use and health benefits of smoking cessation discussed] : Risk of tobacco use and health benefits of smoking cessation discussed [Cessation strategies including cessation program discussed] : Cessation strategies including cessation program discussed [Use of nicotine replacement therapies and other medications discussed] : Use of nicotine replacement therapies and other medications discussed [Encouraged to pick a quit date and identify support needed to quit] : Encouraged to pick a quit date and identify support needed to quit [Willing to Quit Smoking] : Willing to quit smoking

## 2021-06-16 NOTE — CARDIOLOGY SUMMARY
[de-identified] : 10/2020: normal sinus rhythm  [de-identified] : 2018: Nuclear stress test. No ischemia. Small fixed defect. Normal LV function.  [de-identified] : 2018: normal LV systolic function  [de-identified] : 2009 [de-identified] : Carotid doppler 2018: mild smooth plaque nonobsx [de-identified] : Labs 5/2019: ***, , HDL 63, creat 1.45, K 4.9

## 2021-06-16 NOTE — HISTORY OF PRESENT ILLNESS
[FreeTextEntry1] : JESÚS LOZANO  is a 66 year old  M here for routine cardiovascular followup. \par \par There is a history of coronary artery disease. \par In 2009 he had a myocardial infarction.\par Presented with abrupt onset of chest discomfort, which was treated with a stent.  \par There are no further symptoms of angina. \par There is no history of symptomatic congestive heart failure rheumatic heart disease or valvular disease.\par There is no history of symptomatic arrhythmias including atrial fibrillation.\par LP(a) within normal limits\par  \par Improved dizziness off ACE. \par There has been significant diet/lifestyle indiscretion over the past year. Weight gain. \par He stopped crestor 5 d/t debilitating myalgias, reported myalgias on multiple prior statins. \par He had foot pain d/t plantars fasciitis, required NSAIDS, and subsequently stopped his aspirin!!! \par He continues to vape with tobacco. \par Now takes Odefesy for HIV. \par \par Remains active gardening/yard work. \par There is no exertional chest pain, pressure or discomfort. \par There is no significant dyspnea on exertion or orthopnea. \par There are no symptomatic palpitations, lightheadedness, dizziness or syncope.

## 2021-07-19 ENCOUNTER — APPOINTMENT (OUTPATIENT)
Dept: CARDIOLOGY | Facility: CLINIC | Age: 66
End: 2021-07-19
Payer: MEDICARE

## 2021-07-19 PROCEDURE — 93306 TTE W/DOPPLER COMPLETE: CPT

## 2021-07-28 ENCOUNTER — TRANSCRIPTION ENCOUNTER (OUTPATIENT)
Age: 66
End: 2021-07-28

## 2021-08-03 ENCOUNTER — APPOINTMENT (OUTPATIENT)
Dept: UROLOGY | Facility: CLINIC | Age: 66
End: 2021-08-03
Payer: MEDICARE

## 2021-08-03 VITALS — TEMPERATURE: 97.9 F

## 2021-08-03 DIAGNOSIS — N52.8 OTHER MALE ERECTILE DYSFUNCTION: ICD-10-CM

## 2021-08-03 DIAGNOSIS — N40.1 BENIGN PROSTATIC HYPERPLASIA WITH LOWER URINARY TRACT SYMPMS: ICD-10-CM

## 2021-08-03 PROCEDURE — 99214 OFFICE O/P EST MOD 30 MIN: CPT

## 2021-08-03 NOTE — PHYSICAL EXAM
[General Appearance - Well Developed] : well developed [General Appearance - Well Nourished] : well nourished [Normal Appearance] : normal appearance [Well Groomed] : well groomed [General Appearance - In No Acute Distress] : no acute distress [Bowel Sounds] : normal bowel sounds [Abdomen Soft] : soft [Abdomen Tenderness] : non-tender [Abdomen Mass (___ Cm)] : no abdominal mass palpated [Costovertebral Angle Tenderness] : no ~M costovertebral angle tenderness [Anus Abnormality] : the anus and perineum were normal [Rectal Exam - Rectum] : digital rectal exam was normal [Prostate Tenderness] : the prostate was not tender [No Prostate Nodules] : no prostate nodules [Prostate Size ___ gm] : prostate size [unfilled] gm [Edema] : no peripheral edema [] : no respiratory distress [Respiration, Rhythm And Depth] : normal respiratory rhythm and effort [Exaggerated Use Of Accessory Muscles For Inspiration] : no accessory muscle use [Auscultation Breath Sounds / Voice Sounds] : lungs were clear to auscultation bilaterally [Oriented To Time, Place, And Person] : oriented to person, place, and time [Affect] : the affect was normal [Mood] : the mood was normal [Not Anxious] : not anxious [Normal Station and Gait] : the gait and station were normal for the patient's age [No Focal Deficits] : no focal deficits [No Palpable Adenopathy] : no palpable adenopathy [FreeTextEntry1] : s1-S2 normal without murmur rub or gallop

## 2021-08-03 NOTE — ASSESSMENT
[FreeTextEntry1] : #1) BPH: Continue low dose Cialis 5 mg daily- refill needed.\par PSA ordered for next week-instructed to call the office for the results.\par \par #2) erectile dysfunction: Continue as above.\par \par He will return in one year for reevaluation.

## 2021-10-01 ENCOUNTER — TRANSCRIPTION ENCOUNTER (OUTPATIENT)
Age: 66
End: 2021-10-01

## 2021-10-19 ENCOUNTER — APPOINTMENT (OUTPATIENT)
Dept: CARDIOLOGY | Facility: CLINIC | Age: 66
End: 2021-10-19
Payer: MEDICARE

## 2021-10-19 VITALS
SYSTOLIC BLOOD PRESSURE: 106 MMHG | OXYGEN SATURATION: 97 % | BODY MASS INDEX: 28.44 KG/M2 | TEMPERATURE: 97.1 F | HEIGHT: 72 IN | HEART RATE: 73 BPM | WEIGHT: 210 LBS | DIASTOLIC BLOOD PRESSURE: 62 MMHG

## 2021-10-19 PROCEDURE — 99214 OFFICE O/P EST MOD 30 MIN: CPT

## 2021-10-19 PROCEDURE — 93000 ELECTROCARDIOGRAM COMPLETE: CPT

## 2021-10-20 ENCOUNTER — TRANSCRIPTION ENCOUNTER (OUTPATIENT)
Age: 66
End: 2021-10-20

## 2021-10-21 NOTE — ASSESSMENT
[FreeTextEntry1] : clinical improvement in cholesterol since initiation of PCSK9 inhibitor \par continue aspirin and beta-blocker \par further ischemic evaluation as guided by symptoms\par \par Coronary artery disease. \par Prior myocardial infarction and percutaneous revascularization 2009. \par Nuclear stress test 2018 with fixed defect. No ischemia. \par Currently asymptomatic at good functional status. \par Consider ischemic eval if any evidence of LV dysfunction is noted or new exertional symptoms. \par Aggressive medical therapy and risk modification discussed in detail, especially need for lifelong ASA therapy!\par restart ASA 81mg daily. Limit NSAIDS. \par Intol to multiple statin agents with debilitating myalgias. Start PCSK9-I. No reported interactions with Odefsey. \par \par HLD\par Injectable lipid lowering rx as above. Discussed Mediterranean diet and lifestyle changes, importance of lowering LDL, goal <70. \par \par Tobacco use. \par Smoking cessation counseling done. Pt is motivated to quit. \par \par Any questions and concerns were addressed and resolved. \par \par Advised to increase daily exercise. Blood work ordered for April 2022.

## 2021-10-21 NOTE — HISTORY OF PRESENT ILLNESS
[FreeTextEntry1] : JESÚS LOZANO  is a 66 year old  M \par \par There is a history of coronary artery disease. \par In 2009 he had a myocardial infarction.\par Presented with abrupt onset of chest discomfort, which was treated with a stent. \par \par There are no further symptoms of angina. \par There is no history of symptomatic congestive heart failure rheumatic heart disease or valvular disease.\par There is no history of symptomatic arrhythmias including atrial fibrillation.\par LP(a) within normal limits\par  \par Improved dizziness off ACE. \par There has been significant diet/lifestyle indiscretion over the past year. Weight gain. \par He stopped crestor 5 d/t debilitating myalgias, reported myalgias on multiple prior statins. \par He had foot pain d/t plantars fasciitis, required NSAIDS, and subsequently stopped his aspirin!!! \par He continues to vape with tobacco. \par Now takes Odefesy for HIV. \par \par Remains active gardening/yard work. \par There is no exertional chest pain, pressure or discomfort. \par There is no significant dyspnea on exertion or orthopnea. \par There are no symptomatic palpitations, lightheadedness, dizziness or syncope.\par \par here for routine cardiovascular followup. \par Last office visit started PCSK9 inhibitor \par echocardiogram July 2021 is normal left ventricular function minimal valvular heart disease \par blood work October 2021 total cholesterol 160 LDL 81 \par \par Working part time. Little to no exercise. Reports no foot pain.\par Taking asprin 81 mg once a day. \par No symptoms of dyspnea, or dizziness. \par Smoking one vape a day. \par \par ECG: 10/2020: normal sinus rhythm \par Stress Test: 2018: Nuclear stress test. No ischemia. Small fixed defect. Normal LV function. \par Cardiac Cath/PCI: 2009 \par Carotid/Aorta/Peripheral Vascular: Carotid doppler 2018: mild smooth plaque nonobsx \par

## 2022-02-10 ENCOUNTER — NON-APPOINTMENT (OUTPATIENT)
Age: 67
End: 2022-02-10

## 2022-02-28 ENCOUNTER — NON-APPOINTMENT (OUTPATIENT)
Age: 67
End: 2022-02-28

## 2022-02-28 ENCOUNTER — APPOINTMENT (OUTPATIENT)
Dept: OPHTHALMOLOGY | Facility: CLINIC | Age: 67
End: 2022-02-28
Payer: MEDICARE

## 2022-02-28 PROCEDURE — 99203 OFFICE O/P NEW LOW 30 MIN: CPT

## 2022-03-07 ENCOUNTER — APPOINTMENT (OUTPATIENT)
Dept: OPHTHALMOLOGY | Facility: CLINIC | Age: 67
End: 2022-03-07

## 2022-03-14 ENCOUNTER — NON-APPOINTMENT (OUTPATIENT)
Age: 67
End: 2022-03-14

## 2022-07-27 ENCOUNTER — NON-APPOINTMENT (OUTPATIENT)
Age: 67
End: 2022-07-27

## 2022-07-27 ENCOUNTER — APPOINTMENT (OUTPATIENT)
Dept: CARDIOLOGY | Facility: CLINIC | Age: 67
End: 2022-07-27

## 2022-07-27 VITALS
WEIGHT: 204 LBS | DIASTOLIC BLOOD PRESSURE: 66 MMHG | OXYGEN SATURATION: 97 % | HEART RATE: 59 BPM | TEMPERATURE: 97.3 F | SYSTOLIC BLOOD PRESSURE: 100 MMHG | HEIGHT: 72 IN | BODY MASS INDEX: 27.63 KG/M2

## 2022-07-27 DIAGNOSIS — R42 DIZZINESS AND GIDDINESS: ICD-10-CM

## 2022-07-27 DIAGNOSIS — I25.2 OLD MYOCARDIAL INFARCTION: ICD-10-CM

## 2022-07-27 DIAGNOSIS — Z72.0 TOBACCO USE: ICD-10-CM

## 2022-07-27 DIAGNOSIS — Z95.5 PRESENCE OF CORONARY ANGIOPLASTY IMPLANT AND GRAFT: ICD-10-CM

## 2022-07-27 DIAGNOSIS — E78.5 HYPERLIPIDEMIA, UNSPECIFIED: ICD-10-CM

## 2022-07-27 PROCEDURE — 99406 BEHAV CHNG SMOKING 3-10 MIN: CPT

## 2022-07-27 PROCEDURE — 99214 OFFICE O/P EST MOD 30 MIN: CPT

## 2022-07-27 RX ORDER — ASPIRIN 81 MG/1
81 TABLET, CHEWABLE ORAL
Refills: 0 | Status: ACTIVE | COMMUNITY
Start: 2022-07-27

## 2022-07-27 RX ORDER — ASPIRIN 81 MG/1
81 TABLET ORAL
Qty: 90 | Refills: 0 | Status: DISCONTINUED | COMMUNITY
Start: 2021-06-15 | End: 2022-07-27

## 2022-08-03 ENCOUNTER — APPOINTMENT (OUTPATIENT)
Dept: UROLOGY | Facility: CLINIC | Age: 67
End: 2022-08-03

## 2022-08-10 NOTE — ASSESSMENT
[FreeTextEntry1] : last blood work and EKG have been reviewed. \par asked him to restart his aspirin therapy.  \par refilled a lower dose of carvedilol\par Refilled Praluent.  \par Smoking cessation has been emphasized.  \par Follow-up blood work has been requested.  \par Further cardiovascular testing as guided by symptoms.  \par Continue regular aerobic exercise program.\par \par Coronary artery disease. \par Prior myocardial infarction and percutaneous revascularization 2009. \par Nuclear stress test 2018 with fixed defect. No ischemia. \par Currently asymptomatic at good functional status. \par Consider ischemic eval if any evidence of LV dysfunction is noted or new exertional symptoms. \par Aggressive medical therapy and risk modification discussed in detail, especially need for lifelong ASA therapy!\par restart ASA 81mg daily. Limit NSAIDS. \par Intol to multiple statin agents with debilitating myalgias. Start PCSK9-I. No reported interactions with Odefsey. \par \par HLD\par Injectable lipid lowering rx as above. Discussed Mediterranean diet and lifestyle changes, importance of lowering LDL, goal <70. \par \par Tobacco use. \par Smoking cessation counseling done. \par \par Any questions and concerns were addressed and resolved. \par

## 2022-08-10 NOTE — HISTORY OF PRESENT ILLNESS
[FreeTextEntry1] : JESÚS LOZANO  is a 67 year old  M\par \par There is a history of coronary artery disease. \par In 2009 he had a myocardial infarction.\par Presented with abrupt onset of chest discomfort, which was treated with a stent. \par \par There are no further symptoms of angina. \par There is no history of symptomatic congestive heart failure rheumatic heart disease or valvular disease.\par There is no history of symptomatic arrhythmias including atrial fibrillation.\par LP(a) within normal limits\par  \par Improved dizziness off ACE. \par He stopped crestor 5 d/t debilitating myalgias, reported myalgias on multiple prior statins. \par He had foot pain d/t plantars fasciitis, required NSAIDS, and subsequently stopped his aspirin!!! \par He continues to vape with tobacco. \par Now takes Odefesy for HIV. \par \par Remains active gardening/yard work. \par He is active in the pool without limitations.  \par He is compliant with his medical therapy.  \par He stopped taking aspirin after the recent news??\par There is left foot pain.  He continues to vape.  \par He previously had an episode of dizziness.  This was related to a change in his glasses prescription.  \par He does note wheezing at night.\par There is no exertional chest pain, pressure or discomfort. \par There is no significant dyspnea on exertion or orthopnea. \par There are no symptomatic palpitations or syncope.\par \par here for routine cardiovascular followup. \par Last office visit started PCSK9 inhibitor \par \par Last blood work March 2022 has a creatinine of 1.38, hemoglobin 14.5 \par Last EKG has not normal sinus rhythm nonspecific ST changes.  \par echocardiogram July 2021 is normal left ventricular function minimal valvular heart disease \par blood work October 2021 total cholesterol 160 LDL 81 \par Stress Test: 2018: Nuclear stress test. No ischemia. Small fixed defect. Normal LV function. \par Cardiac Cath/PCI: 2009 \par Carotid/Aorta/Peripheral Vascular: Carotid doppler 2018: mild smooth plaque nonobsx \par \par

## 2022-08-10 NOTE — COUNSELING
[Yes] : Risk of tobacco use and health benefits of smoking cessation discussed: Yes [FreeTextEntry1] : 3

## 2023-01-31 ENCOUNTER — NON-APPOINTMENT (OUTPATIENT)
Age: 68
End: 2023-01-31

## 2023-02-21 ENCOUNTER — NON-APPOINTMENT (OUTPATIENT)
Age: 68
End: 2023-02-21

## 2023-02-27 ENCOUNTER — APPOINTMENT (OUTPATIENT)
Dept: COLORECTAL SURGERY | Facility: CLINIC | Age: 68
End: 2023-02-27

## 2023-03-02 ENCOUNTER — NON-APPOINTMENT (OUTPATIENT)
Age: 68
End: 2023-03-02

## 2023-03-15 ENCOUNTER — NON-APPOINTMENT (OUTPATIENT)
Age: 68
End: 2023-03-15

## 2023-04-21 NOTE — HISTORY OF PRESENT ILLNESS
[FreeTextEntry1] : JESÚS LOZANO  is a 68 year old  M\par \par \par There is a history of coronary artery disease. \par In 2009 he had a myocardial infarction.\par Presented with abrupt onset of chest discomfort, which was treated with a stent. \par \par There are no further symptoms of angina. \par There is no history of symptomatic congestive heart failure rheumatic heart disease or valvular disease.\par There is no history of symptomatic arrhythmias including atrial fibrillation.\par LP(a) within normal limits\par  \par Improved dizziness off ACE. \par He stopped crestor 5 d/t debilitating myalgias, reported myalgias on multiple prior statins. \par He had foot pain d/t plantars fasciitis, required NSAIDS, and subsequently stopped his aspirin!!! \par He continues to vape with tobacco. \par Now takes Odefesy for HIV. \par \par Remains active gardening/yard work. \par He is active in the pool without limitations.  \par He is compliant with his medical therapy.  \par He stopped taking aspirin after the recent news??\par There is left foot pain.  He continues to vape.  \par He previously had an episode of dizziness.  This was related to a change in his glasses prescription.  \par He does note wheezing at night.\par There is no exertional chest pain, pressure or discomfort. \par There is no significant dyspnea on exertion or orthopnea. \par There are no symptomatic palpitations or syncope.\par \par here for routine cardiovascular followup. \par Last office visit started PCSK9 inhibitor \par \par Last blood work March 2022 has a creatinine of 1.38, hemoglobin 14.5 \par Last EKG has not normal sinus rhythm nonspecific ST changes.  \par echocardiogram July 2021 is normal left ventricular function minimal valvular heart disease \par blood work October 2021 total cholesterol 160 LDL 81 \par Stress Test: 2018: Nuclear stress test. No ischemia. Small fixed defect. Normal LV function. \par Cardiac Cath/PCI: 2009 \par Carotid/Aorta/Peripheral Vascular: Carotid doppler 2018: mild smooth plaque nonobsx \par \par last blood work and EKG have been reviewed. \par asked him to restart his aspirin therapy.  \par refilled a lower dose of carvedilol\par Refilled Praluent.  \par Smoking cessation has been emphasized.  \par Follow-up blood work has been requested.  \par Further cardiovascular testing as guided by symptoms.  \par Continue regular aerobic exercise program.\par \par Coronary artery disease. \par Prior myocardial infarction and percutaneous revascularization 2009. \par Nuclear stress test 2018 with fixed defect. No ischemia. \par Currently asymptomatic at good functional status. \par Consider ischemic eval if any evidence of LV dysfunction is noted or new exertional symptoms. \par Aggressive medical therapy and risk modification discussed in detail, especially need for lifelong ASA therapy!\par restart ASA 81mg daily. Limit NSAIDS. \par Intol to multiple statin agents with debilitating myalgias. Start PCSK9-I. No reported interactions with Odefsey. \par \par HLD\par Injectable lipid lowering rx as above. Discussed Mediterranean diet and lifestyle changes, importance of lowering LDL, goal <70. \par \par Tobacco use. \par Smoking cessation counseling done. \par \par Any questions and concerns were addressed and resolved. \par \par \par

## 2023-04-26 ENCOUNTER — APPOINTMENT (OUTPATIENT)
Dept: CARDIOLOGY | Facility: CLINIC | Age: 68
End: 2023-04-26

## 2023-05-10 ENCOUNTER — RX CHANGE (OUTPATIENT)
Age: 68
End: 2023-05-10

## 2023-05-10 RX ORDER — ALIROCUMAB 75 MG/ML
75 INJECTION, SOLUTION SUBCUTANEOUS
Qty: 6 | Refills: 1 | Status: ACTIVE | COMMUNITY
Start: 2021-06-15 | End: 1900-01-01

## 2023-05-11 ENCOUNTER — RX CHANGE (OUTPATIENT)
Age: 68
End: 2023-05-11

## 2023-10-02 ENCOUNTER — APPOINTMENT (OUTPATIENT)
Dept: COLORECTAL SURGERY | Facility: CLINIC | Age: 68
End: 2023-10-02
Payer: MEDICARE

## 2023-10-02 VITALS
RESPIRATION RATE: 14 BRPM | HEIGHT: 72 IN | SYSTOLIC BLOOD PRESSURE: 102 MMHG | TEMPERATURE: 97.8 F | DIASTOLIC BLOOD PRESSURE: 66 MMHG | WEIGHT: 215 LBS | OXYGEN SATURATION: 96 % | BODY MASS INDEX: 29.12 KG/M2 | HEART RATE: 59 BPM

## 2023-10-02 DIAGNOSIS — Z12.11 ENCOUNTER FOR SCREENING FOR MALIGNANT NEOPLASM OF COLON: ICD-10-CM

## 2023-10-02 DIAGNOSIS — Z80.6 FAMILY HISTORY OF LEUKEMIA: ICD-10-CM

## 2023-10-02 DIAGNOSIS — Z80.3 FAMILY HISTORY OF MALIGNANT NEOPLASM OF BREAST: ICD-10-CM

## 2023-10-02 DIAGNOSIS — A63.0 ANOGENITAL (VENEREAL) WARTS: ICD-10-CM

## 2023-10-02 PROCEDURE — 46600 DIAGNOSTIC ANOSCOPY SPX: CPT

## 2023-10-02 PROCEDURE — 99203 OFFICE O/P NEW LOW 30 MIN: CPT | Mod: 25

## 2023-10-04 ENCOUNTER — NON-APPOINTMENT (OUTPATIENT)
Age: 68
End: 2023-10-04

## 2024-02-06 ENCOUNTER — TRANSCRIPTION ENCOUNTER (OUTPATIENT)
Age: 69
End: 2024-02-06

## 2024-02-07 ENCOUNTER — OUTPATIENT (OUTPATIENT)
Dept: OUTPATIENT SERVICES | Facility: HOSPITAL | Age: 69
LOS: 1 days | End: 2024-02-07
Payer: MEDICARE

## 2024-02-07 ENCOUNTER — RESULT REVIEW (OUTPATIENT)
Age: 69
End: 2024-02-07

## 2024-02-07 ENCOUNTER — APPOINTMENT (OUTPATIENT)
Dept: COLORECTAL SURGERY | Facility: CLINIC | Age: 69
End: 2024-02-07
Payer: MEDICARE

## 2024-02-07 DIAGNOSIS — Z12.11 ENCOUNTER FOR SCREENING FOR MALIGNANT NEOPLASM OF COLON: ICD-10-CM

## 2024-02-07 DIAGNOSIS — Z98.61 CORONARY ANGIOPLASTY STATUS: Chronic | ICD-10-CM

## 2024-02-07 DIAGNOSIS — K62.5 HEMORRHAGE OF ANUS AND RECTUM: ICD-10-CM

## 2024-02-07 PROCEDURE — 88305 TISSUE EXAM BY PATHOLOGIST: CPT | Mod: 26

## 2024-02-07 PROCEDURE — 88305 TISSUE EXAM BY PATHOLOGIST: CPT

## 2024-02-07 PROCEDURE — 45380 COLONOSCOPY AND BIOPSY: CPT | Mod: PT

## 2024-02-07 PROCEDURE — 45380 COLONOSCOPY AND BIOPSY: CPT

## 2024-02-08 PROBLEM — K62.5 RECTAL BLEEDING: Status: ACTIVE | Noted: 2020-10-29

## 2024-02-09 ENCOUNTER — NON-APPOINTMENT (OUTPATIENT)
Age: 69
End: 2024-02-09

## 2024-02-09 LAB — SURGICAL PATHOLOGY STUDY: SIGNIFICANT CHANGE UP

## 2024-02-12 ENCOUNTER — NON-APPOINTMENT (OUTPATIENT)
Age: 69
End: 2024-02-12

## 2024-12-27 NOTE — REVIEW OF SYSTEMS
Is the patient due for a refill? Yes    Was the patient seen the past year? No    Date of last office visit: 09.12.2023    Does the patient have an upcoming appointment?  Yes   If yes, When? 01.02.2025    Provider to refill:AMOL    Does the patients insurance require a 100 day supply?  No   [Wheezing] : wheezing [Dizziness] : dizziness [Negative] : Heme/Lymph

## 2025-05-20 ENCOUNTER — NON-APPOINTMENT (OUTPATIENT)
Age: 70
End: 2025-05-20

## (undated) DEVICE — VALVE ENDO SURESEAL II 0-5FR

## (undated) DEVICE — FORCEP RADIAL JAW 4 240CM DISP

## (undated) DEVICE — CSC-COLONOSCOPE 2002772: Type: DURABLE MEDICAL EQUIPMENT